# Patient Record
Sex: FEMALE | Race: WHITE | NOT HISPANIC OR LATINO | Employment: FULL TIME | URBAN - METROPOLITAN AREA
[De-identification: names, ages, dates, MRNs, and addresses within clinical notes are randomized per-mention and may not be internally consistent; named-entity substitution may affect disease eponyms.]

---

## 2017-06-07 ENCOUNTER — OFFICE VISIT (OUTPATIENT)
Dept: RETAIL CLINIC | Facility: CLINIC | Age: 32
End: 2017-06-07

## 2017-06-07 VITALS
OXYGEN SATURATION: 100 % | DIASTOLIC BLOOD PRESSURE: 82 MMHG | HEART RATE: 99 BPM | RESPIRATION RATE: 15 BRPM | TEMPERATURE: 98.4 F | SYSTOLIC BLOOD PRESSURE: 125 MMHG

## 2017-06-07 DIAGNOSIS — J02.0 STREP THROAT: Primary | ICD-10-CM

## 2017-06-07 PROBLEM — J02.9 SORE THROAT: Status: ACTIVE | Noted: 2017-06-07

## 2017-06-07 LAB
EXPIRATION DATE: ABNORMAL
INTERNAL CONTROL: ABNORMAL
Lab: ABNORMAL
S PYO AG THROAT QL: POSITIVE

## 2017-06-07 PROCEDURE — 87880 STREP A ASSAY W/OPTIC: CPT | Performed by: NURSE PRACTITIONER

## 2017-06-07 PROCEDURE — 99213 OFFICE O/P EST LOW 20 MIN: CPT | Performed by: NURSE PRACTITIONER

## 2017-06-07 RX ORDER — AMOXICILLIN 500 MG/1
500 TABLET, FILM COATED ORAL 2 TIMES DAILY
Qty: 20 TABLET | Refills: 0 | Status: SHIPPED | OUTPATIENT
Start: 2017-06-07 | End: 2017-06-17

## 2017-06-07 NOTE — PATIENT INSTRUCTIONS

## 2017-06-07 NOTE — PROGRESS NOTES
Crissy Jean is a 31 y.o. female.     Sore Throat    This is a new problem. The current episode started yesterday. The problem has been unchanged. The pain is worse on the right side. Maximum temperature: did not measure. The pain is at a severity of 6/10. The pain is moderate. Associated symptoms include ear pain, headaches and swollen glands. Pertinent negatives include no abdominal pain, congestion, coughing, diarrhea, neck pain, shortness of breath, trouble swallowing or vomiting. She has had exposure to strep. She has tried acetaminophen for the symptoms. The treatment provided mild relief.       The following portions of the patient's history were reviewed and updated as appropriate: allergies, current medications, past family history, past medical history, past social history, past surgical history and problem list.    Review of Systems   Constitutional: Positive for activity change, appetite change and fatigue. Fever: did not measure.   HENT: Positive for ear pain, rhinorrhea and sore throat. Negative for congestion and trouble swallowing.    Eyes: Negative for discharge.   Respiratory: Negative for cough, chest tightness, shortness of breath and wheezing.    Cardiovascular: Negative for chest pain and palpitations.   Gastrointestinal: Negative for abdominal distention, abdominal pain, constipation, diarrhea, nausea and vomiting.   Genitourinary: Negative for difficulty urinating.   Musculoskeletal: Negative for gait problem, neck pain and neck stiffness.   Skin: Negative for color change, pallor and rash.   Neurological: Positive for headaches.   All other systems reviewed and are negative.      Objective   Physical Exam   Constitutional: She is oriented to person, place, and time. Vital signs are normal. She appears well-developed and well-nourished.   HENT:   Head: Normocephalic.   Right Ear: Hearing, tympanic membrane, external ear and ear canal normal.   Left Ear: Hearing, external ear and  ear canal normal. Tympanic membrane is erythematous. A middle ear effusion is present.   Nose: Rhinorrhea present.   Mouth/Throat: Uvula is midline and mucous membranes are normal. Posterior oropharyngeal erythema present. Tonsils are 2+ on the right. Tonsils are 1+ on the left. Tonsillar exudate.   Eyes: Conjunctivae and lids are normal. Pupils are equal, round, and reactive to light.   Neck: Trachea normal and full passive range of motion without pain. Neck supple.   Cardiovascular: Normal rate, regular rhythm and normal heart sounds.    Pulmonary/Chest: Effort normal and breath sounds normal.   Abdominal: Soft. Normal appearance and bowel sounds are normal. There is no tenderness.   Musculoskeletal: Normal range of motion.   Lymphadenopathy:        Head (right side): Submental, submandibular and tonsillar adenopathy present.        Head (left side): Submental, submandibular and tonsillar adenopathy present.     She has cervical adenopathy.   Neurological: She is alert and oriented to person, place, and time. She has normal strength.   Skin: Skin is warm, dry and intact. No rash noted.   Psychiatric: She has a normal mood and affect. Her speech is normal and behavior is normal. Judgment and thought content normal. Cognition and memory are normal.       Assessment/Plan   Kayden was seen today for sore throat.    Diagnoses and all orders for this visit:    Strep throat  -     POCT rapid strep A  -     amoxicillin (AMOXIL) 500 MG tablet; Take 1 tablet by mouth 2 (Two) Times a Day for 10 days.     Rapid strep positive.  Patient agrees with the treatment plan and understands when to return to PCP or seek Urgent care.  Patient may also continue the acetaminophen as directed on the package label for the headache/discomfort and can use lozenges, salt water gargles, and honey for the throat discomfort in addition to increasing her oral hydration and maintaining adequate rest.

## 2018-09-24 ENCOUNTER — OFFICE VISIT (OUTPATIENT)
Dept: RETAIL CLINIC | Facility: CLINIC | Age: 33
End: 2018-09-24

## 2018-09-24 VITALS
TEMPERATURE: 98.1 F | HEART RATE: 95 BPM | SYSTOLIC BLOOD PRESSURE: 140 MMHG | OXYGEN SATURATION: 99 % | RESPIRATION RATE: 20 BRPM | DIASTOLIC BLOOD PRESSURE: 68 MMHG

## 2018-09-24 DIAGNOSIS — H60.502 ACUTE OTITIS EXTERNA OF LEFT EAR, UNSPECIFIED TYPE: Primary | ICD-10-CM

## 2018-09-24 PROCEDURE — 99213 OFFICE O/P EST LOW 20 MIN: CPT | Performed by: NURSE PRACTITIONER

## 2018-09-24 RX ORDER — OFLOXACIN 3 MG/ML
10 SOLUTION AURICULAR (OTIC) DAILY
Qty: 5 ML | Refills: 0 | Status: SHIPPED | OUTPATIENT
Start: 2018-09-24 | End: 2018-10-01

## 2018-09-24 RX ORDER — CIPROFLOXACIN AND DEXAMETHASONE 3; 1 MG/ML; MG/ML
4 SUSPENSION/ DROPS AURICULAR (OTIC) 2 TIMES DAILY
Qty: 7.5 ML | Refills: 0 | Status: SHIPPED | OUTPATIENT
Start: 2018-09-24 | End: 2018-09-24

## 2018-09-24 RX ORDER — ASPIRIN 81 MG/1
81 TABLET ORAL DAILY
COMMUNITY
End: 2021-02-17

## 2018-09-24 NOTE — PATIENT INSTRUCTIONS
"Otitis Externa  Otitis externa is an infection of the outer ear canal. The outer ear canal is the area between the outside of the ear and the eardrum. Otitis externa is sometimes called \"swimmer's ear.\"  What are the causes?  This condition may be caused by:  · Swimming in dirty water.  · Moisture in the ear.  · An injury to the inside of the ear.  · An object stuck in the ear.  · A cut or scrape on the outside of the ear.    What increases the risk?  This condition is more likely to develop in swimmers.  What are the signs or symptoms?  The first symptom of this condition is often itching in the ear. Later signs and symptoms include:  · Swelling of the ear.  · Redness in the ear.  · Ear pain. The pain may get worse when you pull on your ear.  · Pus coming from the ear.    How is this diagnosed?  This condition may be diagnosed by examining the ear and testing fluid from the ear for bacteria and funguses.  How is this treated?  This condition may be treated with:  · Antibiotic ear drops. These are often given for 10-14 days.  · Medicine to reduce itching and swelling.    Follow these instructions at home:  · If you were prescribed antibiotic ear drops, apply them as told by your health care provider. Do not stop using the antibiotic even if your condition improves.  · Take over-the-counter and prescription medicines only as told by your health care provider.  · Keep all follow-up visits as told by your health care provider. This is important.  How is this prevented?  · Keep your ear dry. Use the corner of a towel to dry your ear after you swim or bathe.  · Avoid scratching or putting things in your ear. Doing these things can damage the ear canal or remove the protective wax that lines it, which makes it easier for bacteria and funguses to grow.  · Avoid swimming in lakes, polluted water, or pools that may not have the right amount of chlorine.  · Consider making ear drops and putting 3 or 4 drops in each ear after " you swim. Ask your health care provider about how you can make ear drops.  Contact a health care provider if:  · You have a fever.  · After 3 days your ear is still red, swollen, painful, or draining pus.  · Your redness, swelling, or pain gets worse.  · You have a severe headache.  · You have redness, swelling, pain, or tenderness in the area behind your ear.  This information is not intended to replace advice given to you by your health care provider. Make sure you discuss any questions you have with your health care provider.  Document Released: 12/18/2006 Document Revised: 01/24/2017 Document Reviewed: 09/26/2016  [employer] Patient Education © 2018 [employer]

## 2018-09-24 NOTE — PROGRESS NOTES
Crissy Jean is a 32 y.o. female.     Earache    There is pain in the left ear. This is a new problem. The current episode started yesterday. The problem occurs constantly. The problem has been gradually worsening. There has been no fever. The pain is mild. Associated symptoms include headaches and hearing loss (muffled left ). Pertinent negatives include no coughing, ear discharge, neck pain, rhinorrhea or sore throat. She has tried nothing for the symptoms. There is no history of a chronic ear infection, hearing loss or a tympanostomy tube.       The following portions of the patient's history were reviewed and updated as appropriate: allergies, current medications, past medical history, past social history, past surgical history and problem list.    Review of Systems   Constitutional: Negative for appetite change, chills, diaphoresis, fatigue and fever.   HENT: Positive for ear pain and hearing loss (muffled left ). Negative for congestion, dental problem, ear discharge, facial swelling, postnasal drip, rhinorrhea, sinus pressure, sore throat, tinnitus, trouble swallowing and voice change.    Respiratory: Negative for cough.    Musculoskeletal: Negative for myalgias and neck pain.   Skin: Negative for color change.   Neurological: Positive for headaches. Negative for dizziness.       Objective   Physical Exam   Constitutional: She is oriented to person, place, and time. She appears well-developed and well-nourished. She is cooperative.  Non-toxic appearance. She does not appear ill. No distress.   HENT:   Right Ear: Tympanic membrane, external ear and ear canal normal.   Left Ear: Tympanic membrane and external ear normal. There is swelling (canal moderate edema and erythema, membranes friable with small amount of white cheese like drainage) and tenderness. No mastoid tenderness.   Nose: Nose normal. Right sinus exhibits no maxillary sinus tenderness and no frontal sinus tenderness. Left sinus  exhibits no maxillary sinus tenderness and no frontal sinus tenderness.   Mouth/Throat: Uvula is midline, oropharynx is clear and moist and mucous membranes are normal. Tonsils are 2+ on the right. Tonsils are 2+ on the left. No tonsillar exudate.   Eyes: Conjunctivae and lids are normal.   Neurological: She is alert and oriented to person, place, and time.   Vitals reviewed.      Assessment/Plan   Kayden was seen today for earache.    Diagnoses and all orders for this visit:    Acute otitis externa of left ear, unspecified type  -     Discontinue: ciprofloxacin-dexamethasone (CIPRODEX) 0.3-0.1 % otic suspension; Administer 4 drops into the left ear 2 (Two) Times a Day for 7 days.    Other orders  -     ofloxacin (FLOXIN) 0.3 % otic solution; Administer 10 drops into the left ear Daily for 7 days.          -     Keep ear dry        -     Follow up with PCP for persistent symptoms or UC/ER for worsening symptoms

## 2021-01-04 ENCOUNTER — IMMUNIZATION (OUTPATIENT)
Dept: VACCINE CLINIC | Facility: HOSPITAL | Age: 36
End: 2021-01-04

## 2021-01-04 PROCEDURE — 91300 HC SARSCOV02 VAC 30MCG/0.3ML IM: CPT | Performed by: INTERNAL MEDICINE

## 2021-01-04 PROCEDURE — 0001A: CPT | Performed by: INTERNAL MEDICINE

## 2021-01-26 ENCOUNTER — IMMUNIZATION (OUTPATIENT)
Dept: VACCINE CLINIC | Facility: HOSPITAL | Age: 36
End: 2021-01-26

## 2021-01-26 PROCEDURE — 91300 HC SARSCOV02 VAC 30MCG/0.3ML IM: CPT | Performed by: INTERNAL MEDICINE

## 2021-01-26 PROCEDURE — 0002A: CPT | Performed by: INTERNAL MEDICINE

## 2021-02-01 ENCOUNTER — APPOINTMENT (OUTPATIENT)
Dept: GENERAL RADIOLOGY | Facility: HOSPITAL | Age: 36
End: 2021-02-01

## 2021-02-01 ENCOUNTER — APPOINTMENT (OUTPATIENT)
Dept: CT IMAGING | Facility: HOSPITAL | Age: 36
End: 2021-02-01

## 2021-02-01 ENCOUNTER — HOSPITAL ENCOUNTER (EMERGENCY)
Facility: HOSPITAL | Age: 36
Discharge: HOME | End: 2021-02-01
Attending: EMERGENCY MEDICINE | Admitting: EMERGENCY MEDICINE

## 2021-02-01 VITALS
HEIGHT: 68 IN | BODY MASS INDEX: 33.34 KG/M2 | WEIGHT: 220 LBS | RESPIRATION RATE: 19 BRPM | SYSTOLIC BLOOD PRESSURE: 141 MMHG | OXYGEN SATURATION: 97 % | DIASTOLIC BLOOD PRESSURE: 88 MMHG | TEMPERATURE: 97.5 F | HEART RATE: 88 BPM

## 2021-02-01 DIAGNOSIS — R04.2 HEMOPTYSIS: Primary | ICD-10-CM

## 2021-02-01 DIAGNOSIS — R91.1 LUNG NODULE: ICD-10-CM

## 2021-02-01 DIAGNOSIS — N63.0 BREAST MASS IN FEMALE: ICD-10-CM

## 2021-02-01 LAB
ANION GAP SERPL CALCULATED.3IONS-SCNC: 10.5 MMOL/L (ref 5–15)
B PARAPERT DNA SPEC QL NAA+PROBE: NOT DETECTED
B PERT DNA SPEC QL NAA+PROBE: NOT DETECTED
BASOPHILS # BLD AUTO: 0.04 10*3/MM3 (ref 0–0.2)
BASOPHILS NFR BLD AUTO: 0.4 % (ref 0–1.5)
BUN SERPL-MCNC: 6 MG/DL (ref 6–20)
BUN/CREAT SERPL: 8.1 (ref 7–25)
C PNEUM DNA NPH QL NAA+NON-PROBE: NOT DETECTED
CALCIUM SPEC-SCNC: 9.5 MG/DL (ref 8.6–10.5)
CHLORIDE SERPL-SCNC: 104 MMOL/L (ref 98–107)
CO2 SERPL-SCNC: 24.5 MMOL/L (ref 22–29)
CREAT SERPL-MCNC: 0.74 MG/DL (ref 0.57–1)
DEPRECATED RDW RBC AUTO: 42.9 FL (ref 37–54)
EOSINOPHIL # BLD AUTO: 0.02 10*3/MM3 (ref 0–0.4)
EOSINOPHIL NFR BLD AUTO: 0.2 % (ref 0.3–6.2)
ERYTHROCYTE [DISTWIDTH] IN BLOOD BY AUTOMATED COUNT: 13 % (ref 12.3–15.4)
FLUAV SUBTYP SPEC NAA+PROBE: NOT DETECTED
FLUBV RNA ISLT QL NAA+PROBE: NOT DETECTED
GFR SERPL CREATININE-BSD FRML MDRD: 89 ML/MIN/1.73
GLUCOSE SERPL-MCNC: 90 MG/DL (ref 65–99)
HADV DNA SPEC NAA+PROBE: NOT DETECTED
HCG SERPL QL: NEGATIVE
HCOV 229E RNA SPEC QL NAA+PROBE: NOT DETECTED
HCOV HKU1 RNA SPEC QL NAA+PROBE: NOT DETECTED
HCOV NL63 RNA SPEC QL NAA+PROBE: NOT DETECTED
HCOV OC43 RNA SPEC QL NAA+PROBE: NOT DETECTED
HCT VFR BLD AUTO: 43.6 % (ref 34–46.6)
HGB BLD-MCNC: 14.9 G/DL (ref 12–15.9)
HMPV RNA NPH QL NAA+NON-PROBE: NOT DETECTED
HPIV1 RNA SPEC QL NAA+PROBE: NOT DETECTED
HPIV2 RNA SPEC QL NAA+PROBE: NOT DETECTED
HPIV3 RNA NPH QL NAA+PROBE: NOT DETECTED
HPIV4 P GENE NPH QL NAA+PROBE: NOT DETECTED
IMM GRANULOCYTES # BLD AUTO: 0.03 10*3/MM3 (ref 0–0.05)
IMM GRANULOCYTES NFR BLD AUTO: 0.3 % (ref 0–0.5)
LYMPHOCYTES # BLD AUTO: 2.05 10*3/MM3 (ref 0.7–3.1)
LYMPHOCYTES NFR BLD AUTO: 22.7 % (ref 19.6–45.3)
M PNEUMO IGG SER IA-ACNC: NOT DETECTED
MCH RBC QN AUTO: 31 PG (ref 26.6–33)
MCHC RBC AUTO-ENTMCNC: 34.2 G/DL (ref 31.5–35.7)
MCV RBC AUTO: 90.6 FL (ref 79–97)
MONOCYTES # BLD AUTO: 0.46 10*3/MM3 (ref 0.1–0.9)
MONOCYTES NFR BLD AUTO: 5.1 % (ref 5–12)
NEUTROPHILS NFR BLD AUTO: 6.45 10*3/MM3 (ref 1.7–7)
NEUTROPHILS NFR BLD AUTO: 71.3 % (ref 42.7–76)
NRBC BLD AUTO-RTO: 0 /100 WBC (ref 0–0.2)
PLATELET # BLD AUTO: 313 10*3/MM3 (ref 140–450)
PMV BLD AUTO: 8.5 FL (ref 6–12)
POTASSIUM SERPL-SCNC: 4.4 MMOL/L (ref 3.5–5.2)
QT INTERVAL: 353 MS
RBC # BLD AUTO: 4.81 10*6/MM3 (ref 3.77–5.28)
RHINOVIRUS RNA SPEC NAA+PROBE: NOT DETECTED
RSV RNA NPH QL NAA+NON-PROBE: NOT DETECTED
SARS-COV-2 RNA NPH QL NAA+NON-PROBE: NOT DETECTED
SODIUM SERPL-SCNC: 139 MMOL/L (ref 136–145)
TROPONIN T SERPL-MCNC: <0.01 NG/ML (ref 0–0.03)
WBC # BLD AUTO: 9.05 10*3/MM3 (ref 3.4–10.8)

## 2021-02-01 PROCEDURE — 0202U NFCT DS 22 TRGT SARS-COV-2: CPT | Performed by: EMERGENCY MEDICINE

## 2021-02-01 PROCEDURE — 99283 EMERGENCY DEPT VISIT LOW MDM: CPT

## 2021-02-01 PROCEDURE — 84484 ASSAY OF TROPONIN QUANT: CPT | Performed by: EMERGENCY MEDICINE

## 2021-02-01 PROCEDURE — 80048 BASIC METABOLIC PNL TOTAL CA: CPT | Performed by: EMERGENCY MEDICINE

## 2021-02-01 PROCEDURE — 84703 CHORIONIC GONADOTROPIN ASSAY: CPT | Performed by: EMERGENCY MEDICINE

## 2021-02-01 PROCEDURE — 71275 CT ANGIOGRAPHY CHEST: CPT

## 2021-02-01 PROCEDURE — 85025 COMPLETE CBC W/AUTO DIFF WBC: CPT | Performed by: EMERGENCY MEDICINE

## 2021-02-01 PROCEDURE — 71045 X-RAY EXAM CHEST 1 VIEW: CPT

## 2021-02-01 PROCEDURE — 93010 ELECTROCARDIOGRAM REPORT: CPT | Performed by: INTERNAL MEDICINE

## 2021-02-01 PROCEDURE — 0 IOPAMIDOL PER 1 ML: Performed by: EMERGENCY MEDICINE

## 2021-02-01 PROCEDURE — 93005 ELECTROCARDIOGRAM TRACING: CPT | Performed by: EMERGENCY MEDICINE

## 2021-02-01 RX ORDER — SODIUM CHLORIDE 0.9 % (FLUSH) 0.9 %
10 SYRINGE (ML) INJECTION AS NEEDED
Status: DISCONTINUED | OUTPATIENT
Start: 2021-02-01 | End: 2021-02-01 | Stop reason: HOSPADM

## 2021-02-01 RX ADMIN — IOPAMIDOL 100 ML: 755 INJECTION, SOLUTION INTRAVENOUS at 15:02

## 2021-02-01 NOTE — ED PROVIDER NOTES
EMERGENCY DEPARTMENT ENCOUNTER    Room Number:    Date of encounter:  2021  PCP: Provider, No Known  Historian: Patient,       HPI:  Chief Complaint: Hemoptysis  A complete HPI/ROS/PMH/PSH/SH/FH are unobtainable due to: None    Context: Kayden Jean is a 35 y.o. female who presents to the ED c/o hemoptysis.  Onset today.  She reports blood streaks with thin clear sputum.  She reports having a chronic smoker's cough at baseline.  Mild dyspnea.  No chest pain.  No fever.    On review systems, the patient complains of chronic tingling in her hands and feet for the past 2 years.  This is unchanged.      PAST MEDICAL HISTORY  Active Ambulatory Problems     Diagnosis Date Noted   • Pre-eclampsia 2016   • Mild preeclampsia 2016   • Sore throat 2017     Resolved Ambulatory Problems     Diagnosis Date Noted   • Pregnancy 2016     Past Medical History:   Diagnosis Date   • Abnormal Pap smear of cervix    • Anxiety    • Gestational hypertension    • Otitis externa    • Preeclampsia          PAST SURGICAL HISTORY  Past Surgical History:   Procedure Laterality Date   • COLPOSCOPY W/ BIOPSY / CURETTAGE     • DILATATION AND CURETTAGE     • WISDOM TOOTH EXTRACTION           FAMILY HISTORY  Family History   Problem Relation Age of Onset   • Hypertension Father    • Coronary artery disease Paternal Grandfather          SOCIAL HISTORY  Social History     Socioeconomic History   • Marital status:      Spouse name: Not on file   • Number of children: Not on file   • Years of education: Not on file   • Highest education level: Not on file   Tobacco Use   • Smoking status: Former Smoker     Quit date: 2015     Years since quittin.5   • Smokeless tobacco: Never Used   Substance and Sexual Activity   • Alcohol use: No   • Drug use: No   • Sexual activity: Yes     Partners: Male         ALLERGIES  Codeine        REVIEW OF SYSTEMS  Review of Systems     All systems reviewed and  negative except for those discussed in HPI.       PHYSICAL EXAM    I have reviewed the triage vital signs and nursing notes.    ED Triage Vitals   Temp Heart Rate Resp BP SpO2   02/01/21 1032 02/01/21 1032 02/01/21 1032 02/01/21 1238 02/01/21 1032   97.5 °F (36.4 °C) 107 16 142/85 100 %      Temp src Heart Rate Source Patient Position BP Location FiO2 (%)   02/01/21 1032 02/01/21 1032 02/01/21 1234 02/01/21 1234 --   Tympanic Monitor Lying Right arm        Physical Exam  GENERAL: not distressed  HENT: nares patent  EYES: no scleral icterus  CV: regular rhythm, regular rate, no murmur  RESPIRATORY: normal effort, clear to auscultation bilaterally  ABDOMEN: soft, nontender  MUSCULOSKELETAL: no deformity, no lower extremity edema or tenderness  NEURO: alert, moves all extremities, follows commands  SKIN: warm, dry        LAB RESULTS  Recent Results (from the past 24 hour(s))   Basic Metabolic Panel    Collection Time: 02/01/21  1:20 PM    Specimen: Blood   Result Value Ref Range    Glucose 90 65 - 99 mg/dL    BUN 6 6 - 20 mg/dL    Creatinine 0.74 0.57 - 1.00 mg/dL    Sodium 139 136 - 145 mmol/L    Potassium 4.4 3.5 - 5.2 mmol/L    Chloride 104 98 - 107 mmol/L    CO2 24.5 22.0 - 29.0 mmol/L    Calcium 9.5 8.6 - 10.5 mg/dL    eGFR Non African Amer 89 >60 mL/min/1.73    BUN/Creatinine Ratio 8.1 7.0 - 25.0    Anion Gap 10.5 5.0 - 15.0 mmol/L   CBC Auto Differential    Collection Time: 02/01/21  1:20 PM    Specimen: Blood   Result Value Ref Range    WBC 9.05 3.40 - 10.80 10*3/mm3    RBC 4.81 3.77 - 5.28 10*6/mm3    Hemoglobin 14.9 12.0 - 15.9 g/dL    Hematocrit 43.6 34.0 - 46.6 %    MCV 90.6 79.0 - 97.0 fL    MCH 31.0 26.6 - 33.0 pg    MCHC 34.2 31.5 - 35.7 g/dL    RDW 13.0 12.3 - 15.4 %    RDW-SD 42.9 37.0 - 54.0 fl    MPV 8.5 6.0 - 12.0 fL    Platelets 313 140 - 450 10*3/mm3    Neutrophil % 71.3 42.7 - 76.0 %    Lymphocyte % 22.7 19.6 - 45.3 %    Monocyte % 5.1 5.0 - 12.0 %    Eosinophil % 0.2 (L) 0.3 - 6.2 %    Basophil  % 0.4 0.0 - 1.5 %    Immature Grans % 0.3 0.0 - 0.5 %    Neutrophils, Absolute 6.45 1.70 - 7.00 10*3/mm3    Lymphocytes, Absolute 2.05 0.70 - 3.10 10*3/mm3    Monocytes, Absolute 0.46 0.10 - 0.90 10*3/mm3    Eosinophils, Absolute 0.02 0.00 - 0.40 10*3/mm3    Basophils, Absolute 0.04 0.00 - 0.20 10*3/mm3    Immature Grans, Absolute 0.03 0.00 - 0.05 10*3/mm3    nRBC 0.0 0.0 - 0.2 /100 WBC   hCG, Serum, Qualitative    Collection Time: 02/01/21  1:20 PM    Specimen: Blood   Result Value Ref Range    HCG Qualitative Negative Negative   Troponin    Collection Time: 02/01/21  1:20 PM    Specimen: Blood   Result Value Ref Range    Troponin T <0.010 0.000 - 0.030 ng/mL   Respiratory Panel PCR w/COVID-19(SARS-CoV-2) GEOFF/ERIC/PORFIRIO/PAD/COR/MAD/ERIN In-House, NP Swab in [facility], 3-4 HR TAT - Swab, Nasopharynx    Collection Time: 02/01/21  1:23 PM    Specimen: Nasopharynx; Swab   Result Value Ref Range    ADENOVIRUS, PCR Not Detected Not Detected    Coronavirus 229E Not Detected Not Detected    Coronavirus HKU1 Not Detected Not Detected    Coronavirus NL63 Not Detected Not Detected    Coronavirus OC43 Not Detected Not Detected    COVID19 Not Detected Not Detected - Ref. Range    Human Metapneumovirus Not Detected Not Detected    Human Rhinovirus/Enterovirus Not Detected Not Detected    Influenza A PCR Not Detected Not Detected    Influenza B PCR Not Detected Not Detected    Parainfluenza Virus 1 Not Detected Not Detected    Parainfluenza Virus 2 Not Detected Not Detected    Parainfluenza Virus 3 Not Detected Not Detected    Parainfluenza Virus 4 Not Detected Not Detected    RSV, PCR Not Detected Not Detected    Bordetella pertussis pcr Not Detected Not Detected    Bordetella parapertussis PCR Not Detected Not Detected    Chlamydophila pneumoniae PCR Not Detected Not Detected    Mycoplasma pneumo by PCR Not Detected Not Detected   ECG 12 Lead    Collection Time: 02/01/21  1:31 PM   Result Value Ref Range    QT Interval 353 ms        Ordered the above labs and independently reviewed the results.        RADIOLOGY  Xr Chest 1 View    Result Date: 2/1/2021  ONE VIEW PORTABLE CHEST  HISTORY: Hemoptysis. Possible COVID-19 pneumonia.  FINDINGS: The lungs are well-expanded and clear and the heart and hilar structures are normal. There is no acute disease or change from 07/18/2013.  This report was finalized on 2/1/2021 3:27 PM by Dr. Carson Darby M.D.      Ct Angiogram Chest    Result Date: 2/1/2021  CT ANGIOGRAM CHEST-  HISTORY:  Hemoptysis, smoker.  TECHNIQUE: CT of the chest was performed following the administration of intravenous contrast using a PE protocol. Reformatted and 3-D images were reviewed. Radiation dose reduction techniques were utilized, including automated exposure control and exposure modulation based on body size.  COMPARISON:  CT abdomen and pelvis with contrast 09/27/2013. Chest radiograph 02/01/2021.   FINDINGS:   This is a diagnostic quality PE study. No acute pulmonary embolism is identified to the segmental pulmonary artery level. There are no pathologically enlarged thoracic lymph nodes. Heart size is normal. There is no pericardial effusion. There is a normal thoracic aortic branch pattern. Pleural spaces are clear. Limited imaging through the upper abdomen is within normal limits. There is a 1.5 cm soft tissue density mass in the upper posterior right breast. The visualized osseous structures are age-appropriate in appearance. The trachea and central bronchi are clear. There is no focal consolidation. There is a 0.4 cm nodule along the pleural surface in the anterior inferior right upper lobe.        1.  No acute pulmonary embolism to the segmental pulmonary artery level. 2.  Incompletely characterized 1.5 cm right breast mass. Recommend further evaluation with bilateral diagnostic mammogram and right breast ultrasound. 3.  Nonspecific 0.4 cm nodule in the right upper lobe, which may represent a perifissural lymph  node. Consider follow-up CT chest in one year. 4.  No focal consolidation or effusion.  Discussed with Dr. Plasencia at 3:40 PM.  This report was finalized on 2/1/2021 3:41 PM by Dr. Gertrude Hughes M.D.        I ordered the above noted radiological studies. Reviewed by me and discussed with radiologist.  See dictation for official radiology interpretation.      PROCEDURES    Procedures      MEDICATIONS GIVEN IN ER    Medications   iopamidol (ISOVUE-370) 76 % injection 100 mL (100 mL Intravenous Given by Other 2/1/21 1502)         PROGRESS, DATA ANALYSIS, CONSULTS, AND MEDICAL DECISION MAKING    All labs have been independently reviewed by me.  All radiology studies have been reviewed by me and discussed with radiologist dictating the report.   EKG's independently viewed and interpreted by me.  Discussion below represents my analysis of pertinent findings related to patient's condition, differential diagnosis, treatment plan and final disposition.    Differential diagnosis for hemoptysis:  - bronchitis  - bronchiectasis  - aspergilloma  - tumor  - tuberculosis  - lung abscess  - pulmonary embolism  - coagulopathy   - autoimmune disorders  - AV malformation  - alveolar hemorrhage   - mitral stenosis  - pneumonia    ED Course as of Feb 01 2256   Mon Feb 01, 2021   1333 EKG interpreted by myself.  Time 1331.  Sinus rhythm.  Heart 86.  Normal intervals and axis.  No acute ST abnormality.    [TD]   1552 Troponin T: <0.010 [TD]   1553 Creatinine: 0.74 [TD]   1553 WBC: 9.05 [TD]   1553 COVID19: Not Detected [TD]      ED Course User Index  [TD] Martinez Plasencia II, MD       CT imaging shows no evidence of pneumonia.  No pulmonary embolism.    I did discuss with the patient the abnormal findings of the lung nodule as well as the breast mass.  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  However, I encouraged continued follow-up.  Additionally, I recommended repeat CT scan  in 1 year per radiology recommendations.  I also recommended follow-up with pulmonology should she continue to have the persistent hemoptysis.    PPE: Both the patient and I wore a surgical mask throughout the entire patient encounter. I wore protective goggles.     AS OF 22:56 EST VITALS:    BP - 141/88  HR - 88  TEMP - 97.5 °F (36.4 °C) (Tympanic)  O2 SATS - 97%        DIAGNOSIS  Final diagnoses:   Hemoptysis   Lung nodule   Breast mass in female         DISPOSITION  DISCHARGE    FOLLOW-UP  [facility]  [address] [facility] [address] [phone]  Go to   If symptoms worsen      Follow-up with your primary care physician.  You should have a repeat CT scan in 1 year of your lungs.  Please have her gynecologist follow your breast mass.        Manish Montejo MD  [address] [id number] [phone]    Call in 1 day  to schedule a follow up appointment         Medication List      No changes were made to your prescriptions during this visit.                  Martinez Plasencia II, MD  02/01/21 8094

## 2021-02-01 NOTE — ED TRIAGE NOTES
Woke this am feeling lightheaded c tingling in hands and feet.  She reports she just coughed up a little blood    Patient was placed in face mask during first look triage.  Patient was wearing a face mask throughout encounter.  I wore personal protective equipment throughout the encounter.  Hand hygiene was performed before and after patient encounter.

## 2021-02-02 ENCOUNTER — EPISODE CHANGES (OUTPATIENT)
Dept: CASE MANAGEMENT | Facility: OTHER | Age: 36
End: 2021-02-02

## 2021-02-17 ENCOUNTER — OFFICE VISIT (OUTPATIENT)
Dept: SURGERY | Facility: CLINIC | Age: 36
End: 2021-02-17

## 2021-02-17 VITALS
WEIGHT: 231 LBS | HEART RATE: 96 BPM | HEIGHT: 68 IN | SYSTOLIC BLOOD PRESSURE: 148 MMHG | DIASTOLIC BLOOD PRESSURE: 78 MMHG | BODY MASS INDEX: 35.01 KG/M2 | OXYGEN SATURATION: 99 %

## 2021-02-17 DIAGNOSIS — IMO0001 LUNG NODULE < 6CM ON CT: Primary | ICD-10-CM

## 2021-02-17 PROCEDURE — 99244 OFF/OP CNSLTJ NEW/EST MOD 40: CPT | Performed by: THORACIC SURGERY (CARDIOTHORACIC VASCULAR SURGERY)

## 2021-02-17 NOTE — PROGRESS NOTES
Subjective   Patient ID: Kayden Jean is a 35 y.o. female is being seen for consultation today at the request of [facility] emergency department    History of Present Illness  Dear Colleague,  Kayden Jean was seen in our office today for evaluation and treatment of right upper lobe lung nodule.  Patient is a 35-year-old  female who is an employee at [employer].  She has been smoking for 14 years at 1 pack of cigarettes per day.  She recently presented to the emergency room with hemoptysis.  In retrospect she believes that she had a bloody nose that drained into her larynx and she coughed this up and thought that it was from her lungs.  In the emergency room a CT of her chest was performed.  This showed a 4 mm nodule in the right upper lobe of the lung.  She has been referred here for further evaluation.    She gets bronchitis twice a year which is related to seasonal allergies.  She has had pneumonia 3 times.  She has no history of cancer.  She has had no other history of other pulmonary problems.  She is active without significant shortness of breath.  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    The following portions of the patient's history were reviewed and updated as appropriate: allergies, current medications, past family history, past medical history, past social history, past surgical history and problem list.  Review of Systems   Constitution: Negative.   HENT: Negative.    Eyes: Negative.    Cardiovascular: Negative.    Respiratory: Negative.    Endocrine: Negative.    Hematologic/Lymphatic: Negative.    Skin: Negative.    Musculoskeletal: Negative.    Gastrointestinal: Negative.    Genitourinary: Negative.    Neurological: Negative.    Psychiatric/Behavioral: Negative.    Allergic/Immunologic: Negative.      Patient Active Problem List   Diagnosis   • Pre-eclampsia   • Mild preeclampsia   • Sore throat     Past Medical History:   Diagnosis Date   •  Abnormal Pap smear of cervix    • Anxiety    • Gestational hypertension    • Otitis externa    • Preeclampsia      Past Surgical History:   Procedure Laterality Date   • COLPOSCOPY W/ BIOPSY / CURETTAGE     • DILATATION AND CURETTAGE     • WISDOM TOOTH EXTRACTION       Family History   Problem Relation Age of Onset   • Hypertension Father    • Coronary artery disease Paternal Grandfather      Social History     Socioeconomic History   • Marital status:      Spouse name: Not on file   • Number of children: Not on file   • Years of education: Not on file   • Highest education level: Not on file   Tobacco Use   • Smoking status: Former Smoker     Quit date: 2015     Years since quittin.6   • Smokeless tobacco: Never Used   Substance and Sexual Activity   • Alcohol use: No   • Drug use: No   • Sexual activity: Yes     Partners: Male     No current outpatient medications on file.  Allergies   Allergen Reactions   • Codeine Other (See Comments)     hyperactivity        Objective   Vitals:    21 1009   BP: 148/78   Pulse: 96   SpO2: 99%     Physical Exam  Constitutional:       Appearance: Normal appearance. She is well-developed.   HENT:      Head: Normocephalic.   Eyes:      General: Lids are normal.      Conjunctiva/sclera: Conjunctivae normal.      Pupils: Pupils are equal, round, and reactive to light.   Neck:      Musculoskeletal: Normal range of motion and neck supple.      Thyroid: No thyroid mass or thyromegaly.      Vascular: No carotid bruit, hepatojugular reflux or JVD.      Trachea: Trachea normal.   Cardiovascular:      Rate and Rhythm: Normal rate and regular rhythm.  No extrasystoles are present.     Chest Wall: PMI is not displaced.      Pulses: Normal pulses.      Heart sounds: Normal heart sounds, S1 normal and S2 normal.   Pulmonary:      Effort: Pulmonary effort is normal.      Breath sounds: Normal breath sounds.   Abdominal:      General: Bowel sounds are normal.      Palpations:  Abdomen is soft. There is no mass.      Tenderness: There is no abdominal tenderness.      Hernia: No hernia is present.   Musculoskeletal: Normal range of motion.   Skin:     General: Skin is warm and dry.   Neurological:      Mental Status: She is alert and oriented to person, place, and time.      Cranial Nerves: No cranial nerve deficit.      Sensory: No sensory deficit.      Deep Tendon Reflexes: Reflexes are normal and symmetric.   Psychiatric:         Speech: Speech normal.         Behavior: Behavior normal.         Thought Content: Thought content normal.         Judgment: Judgment normal.       Independent Review of Radiographic Studies:    CT scan of the chest performed February 1, 2021 was independently reviewed.  There was no evidence for pulmonary embolus.  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  There is a 4 mm nodule in the anterior apical aspect of the right upper lobe of the lung.  No other suspicious infiltrates nodules or masses.  No suspicious hilar or mediastinal adenopathy.  No pleural effusions.      Assessment/Plan   Assessment:  4 mm nodule is indeterminate.  This is so small that biopsy would be very difficult and unlikely to be accurate.  Have recommended follow-up with serial CT scans.  Given the small size I think a CT in 6 months is appropriate.  I have discussed this with the patient in detail.  I have answered all of her questions to her satisfaction.  She understands my plan and agrees.    Plan:  Return to the office in 6 months with a CT of the chest without contrast.  I will keep you informed of her progress.  Thank you for allowing me to participate in the care of Ms. Jean.    Diagnoses and all orders for this visit:    Lung nodule < 6cm on CT  -     CT Chest Without Contrast; Future

## 2021-03-10 ENCOUNTER — APPOINTMENT (OUTPATIENT)
Dept: WOMENS IMAGING | Facility: HOSPITAL | Age: 36
End: 2021-03-10

## 2021-03-10 PROCEDURE — G0279 TOMOSYNTHESIS, MAMMO: HCPCS | Performed by: RADIOLOGY

## 2021-03-10 PROCEDURE — 77066 DX MAMMO INCL CAD BI: CPT | Performed by: RADIOLOGY

## 2021-03-10 PROCEDURE — 76641 ULTRASOUND BREAST COMPLETE: CPT | Performed by: RADIOLOGY

## 2021-03-10 PROCEDURE — 77062 BREAST TOMOSYNTHESIS BI: CPT | Performed by: RADIOLOGY

## 2021-08-12 ENCOUNTER — HOSPITAL ENCOUNTER (OUTPATIENT)
Dept: CT IMAGING | Facility: HOSPITAL | Age: 36
Discharge: HOME | End: 2021-08-12
Admitting: THORACIC SURGERY (CARDIOTHORACIC VASCULAR SURGERY)

## 2021-08-12 DIAGNOSIS — IMO0001 LUNG NODULE < 6CM ON CT: ICD-10-CM

## 2021-08-12 PROCEDURE — 71250 CT THORAX DX C-: CPT

## 2021-08-16 ENCOUNTER — TELEPHONE (OUTPATIENT)
Dept: SURGERY | Facility: CLINIC | Age: 36
End: 2021-08-16

## 2021-08-16 NOTE — TELEPHONE ENCOUNTER
Patient was called and left voicemail regarding appointment change with details. She was given a call back number and information to call back if new appointment date and time are not good for her.

## 2021-08-24 ENCOUNTER — OFFICE VISIT (OUTPATIENT)
Dept: SURGERY | Facility: CLINIC | Age: 36
End: 2021-08-24

## 2021-08-24 VITALS
WEIGHT: 231.48 LBS | OXYGEN SATURATION: 98 % | BODY MASS INDEX: 35.08 KG/M2 | SYSTOLIC BLOOD PRESSURE: 158 MMHG | TEMPERATURE: 97.4 F | RESPIRATION RATE: 16 BRPM | DIASTOLIC BLOOD PRESSURE: 95 MMHG | HEIGHT: 68 IN | HEART RATE: 91 BPM

## 2021-08-24 DIAGNOSIS — IMO0001 LUNG NODULE < 6CM ON CT: Primary | ICD-10-CM

## 2021-08-24 PROCEDURE — 99212 OFFICE O/P EST SF 10 MIN: CPT | Performed by: THORACIC SURGERY (CARDIOTHORACIC VASCULAR SURGERY)

## 2021-08-24 RX ORDER — BUSPIRONE HYDROCHLORIDE 10 MG/1
TABLET ORAL DAILY
COMMUNITY
Start: 2021-03-25 | End: 2022-11-26

## 2021-08-24 RX ORDER — ASPIRIN 81 MG/1
81 TABLET ORAL DAILY
COMMUNITY
End: 2022-11-26

## 2021-08-24 RX ORDER — HYDROXYZINE HYDROCHLORIDE 25 MG/1
25 TABLET, FILM COATED ORAL
COMMUNITY
Start: 2021-03-02 | End: 2022-11-26

## 2021-08-24 NOTE — PROGRESS NOTES
"Chief Complaint  No chief complaint on file.     Pulmonary nodule    Subjective          Kayden Jean presents to [facility] THORACIC SURGERY for a 6 month follow up CT chest.  History of Present Illness     35-year-old  female seen in the office today for follow-up of the 4 mm right upper lobe lung nodule.  Patient has a history of smoking approximately 1/2 pack of cigarettes per day for 14 years.  She stopped smoking right after seeing me for this nodule in February.  She reports no cough or hemoptysis.  She has no hoarseness or change in her voice.  She has no pleuritic pain.  She has had no unexplained weight loss.  She is active without significant shortness of breath or wheezing.    Objective   Vital Signs:   /95 (BP Location: Right arm, Patient Position: Sitting, Cuff Size: Adult)   Pulse 91   Temp 97.4 °F (36.3 °C) (Temporal)   Resp 16   Ht 172.2 cm (67.8\")   Wt 105 kg (231 lb 7.7 oz)   SpO2 98%   BMI 35.41 kg/m²     Physical Exam     Chest: No cervical supraclavicular or axillary adenopathy.    Pulmonary: Lungs are clear to auscultation with equal breath sounds bilaterally.  Good air movement in all lung fields.    Cardiac: Regular rate and rhythm.  No murmur or gallop.  No dependent edema.    Result Review :   The following data was reviewed by: Tello Chris III, MD on 08/24/2021:    CT scan of the chest performed August 12, 2021 was independently reviewed and compared to the CT scan from February 1, 2021.  There has been no change in the 4 mm nodule in the anterior inferior aspect of the right upper lobe.  There are no new infiltrates nodules or masses.  There is no suspicious hilar or mediastinal adenopathy.  There are no pleural effusions.  There is a 15 mm right upper posterior breast mass without change.         Assessment and Plan    Diagnoses and all orders for this visit:    1. Lung nodule < 6cm on CT (Primary)  -     CT Chest Without Contrast; " Future      I spent 13 minutes caring for Kayden on this date of service. This time includes time spent by me in the following activities:preparing for the visit, reviewing tests, performing a medically appropriate examination and/or evaluation , counseling and educating the patient/family/caregiver, ordering medications, tests, or procedures, referring and communicating with other health care professionals  and documenting information in the medical record  Follow Up     Pulmonary nodule is stable and most likely does not represent a significant problem.  Because of her smoking history this should be followed.  I have recommended another CT of the chest without contrast in 1 year.    The breast lesion has been biopsied in the past and is benign.  This is followed by her primary care team.    Patient will return to see me in the office in 1year with a CT of the chest without contrast.  I will keep you informed of her progress.    Return in about 1 year (around 8/24/2022) for Recheck.  Patient was given instructions and counseling regarding her condition or for health maintenance advice. Please see specific information pulled into the AVS if appropriate.

## 2021-12-13 ENCOUNTER — APPOINTMENT (OUTPATIENT)
Dept: WOMENS IMAGING | Facility: HOSPITAL | Age: 36
End: 2021-12-13

## 2021-12-13 PROCEDURE — 76641 ULTRASOUND BREAST COMPLETE: CPT | Performed by: RADIOLOGY

## 2021-12-13 PROCEDURE — 77061 BREAST TOMOSYNTHESIS UNI: CPT | Performed by: RADIOLOGY

## 2021-12-13 PROCEDURE — 77065 DX MAMMO INCL CAD UNI: CPT | Performed by: RADIOLOGY

## 2021-12-13 PROCEDURE — G0279 TOMOSYNTHESIS, MAMMO: HCPCS | Performed by: RADIOLOGY

## 2022-08-22 ENCOUNTER — HOSPITAL ENCOUNTER (OUTPATIENT)
Dept: CT IMAGING | Facility: HOSPITAL | Age: 37
Discharge: HOME | End: 2022-08-22
Admitting: THORACIC SURGERY (CARDIOTHORACIC VASCULAR SURGERY)

## 2022-08-22 DIAGNOSIS — IMO0001 LUNG NODULE < 6CM ON CT: ICD-10-CM

## 2022-08-22 PROCEDURE — 71250 CT THORAX DX C-: CPT

## 2022-08-31 ENCOUNTER — OFFICE VISIT (OUTPATIENT)
Dept: SURGERY | Facility: CLINIC | Age: 37
End: 2022-08-31

## 2022-08-31 VITALS
WEIGHT: 242 LBS | DIASTOLIC BLOOD PRESSURE: 92 MMHG | BODY MASS INDEX: 35.84 KG/M2 | HEART RATE: 117 BPM | SYSTOLIC BLOOD PRESSURE: 140 MMHG | OXYGEN SATURATION: 95 % | HEIGHT: 69 IN

## 2022-08-31 DIAGNOSIS — IMO0001 LUNG NODULE < 6CM ON CT: Primary | ICD-10-CM

## 2022-08-31 PROCEDURE — 99212 OFFICE O/P EST SF 10 MIN: CPT | Performed by: NURSE PRACTITIONER

## 2023-09-05 ENCOUNTER — HOSPITAL ENCOUNTER (OUTPATIENT)
Facility: HOSPITAL | Age: 38
Discharge: HOME | End: 2023-09-05
Admitting: NURSE PRACTITIONER
Payer: COMMERCIAL

## 2023-09-05 DIAGNOSIS — IMO0001 LUNG NODULE < 6CM ON CT: ICD-10-CM

## 2023-09-05 PROCEDURE — 71250 CT THORAX DX C-: CPT

## 2023-09-11 ENCOUNTER — TELEPHONE (OUTPATIENT)
Dept: SURGERY | Facility: CLINIC | Age: 38
End: 2023-09-11
Payer: COMMERCIAL

## 2023-09-14 ENCOUNTER — OFFICE VISIT (OUTPATIENT)
Dept: SURGERY | Facility: CLINIC | Age: 38
End: 2023-09-14
Payer: COMMERCIAL

## 2023-09-14 VITALS
HEART RATE: 95 BPM | HEIGHT: 69 IN | OXYGEN SATURATION: 98 % | BODY MASS INDEX: 31.84 KG/M2 | SYSTOLIC BLOOD PRESSURE: 136 MMHG | DIASTOLIC BLOOD PRESSURE: 86 MMHG | WEIGHT: 215 LBS

## 2023-09-14 DIAGNOSIS — R91.1 INCIDENTAL PULMONARY NODULE: Primary | ICD-10-CM

## 2023-09-14 NOTE — PROGRESS NOTES
THORACIC SURGERY CLINIC FOLLOW  UP NOTE    REASON FOR VISIT: Follow-up of pulmonary nodules.     Subjective   HISTORY OF PRESENTING ILLNESS:   Kayden Jean is a 37 y.o. female has significant medical problems as mentioned below.     Kayden Jean is a pleasant 36-year-old female who presents to the office today for continued follow-up and surveillance of pulmonary nodules.  The patient has a smoking history of approximately half pack of cigarettes per day for about 14 years but quit smoking after this nodule was found in February 2021.      She reports no symptoms currently and denies any chest pain, dyspnea, coughing, and can walk 1 block without getting short of breath. She only experiences coughing during allergy season. She has chronic bronchitis that typically occurs during the fall season. She has only been in the hospital with pneumonia on 1 occasion. [place], but she does not have any pets. She is a former smoker and quit when her nodules were diagnosed. She has no history of any cancer and there is no family history of lung cancer.     CT scan of the chest on 9/5/2023 reported stable right upper lobe micronodules.  There is nodular density in the right breast.  She is aware of the asymmetry in her breasts seen on the imaging and was seen by her primary care provider and had an ultrasound performed. It is suspected that she has fibroadenoma and a biopsy was collected and resulted as benign. It has been monitored by frequent mammograms since age 24 when it was discovered.     Past Medical History:   Diagnosis Date    Abnormal Pap smear of cervix     Anxiety     Gestational hypertension     Otitis externa     Preeclampsia        Past Surgical History:   Procedure Laterality Date    COLPOSCOPY W/ BIOPSY / CURETTAGE      DILATATION AND CURETTAGE      WISDOM TOOTH EXTRACTION         Family History   Problem Relation Age of Onset     "Hypertension Father     Coronary artery disease Paternal Grandfather        Social History     Socioeconomic History    Marital status:    Tobacco Use    Smoking status: Former     Types: Cigarettes     Quit date: 2015     Years since quittin.2    Smokeless tobacco: Never   Vaping Use    Vaping Use: Never used   Substance and Sexual Activity    Alcohol use: No    Drug use: No    Sexual activity: Yes     Partners: Male       No current outpatient medications on file.     Allergies   Allergen Reactions    Codeine Other (See Comments)     hyperactivity             Objective    OBJECTIVE:     VITAL SIGNS:  /86 (BP Location: Right arm, Patient Position: Sitting, Cuff Size: Large Adult)   Pulse 95   Ht 175.3 cm (69\")   Wt 97.5 kg (215 lb)   SpO2 98%   BMI 31.75 kg/m²     PHYSICAL EXAM:  Constitutional:       Appearance: Normal appearance.   HENT:      Head: Normocephalic.      Right Ear: External ear normal.      Left Ear: External ear normal.      Nose: Nose normal.      Mouth/Throat: No obvious deformity     Pharynx: Oropharynx is clear.   Eyes:      Conjunctiva/sclera: Conjunctivae normal.   Cardiovascular:      Rate and Rhythm: Normal rate.      Pulses: Normal pulses.   Pulmonary:      Effort: Pulmonary effort is normal.  Abdominal:      Palpations: Abdomen is soft.   Musculoskeletal:         General: Normal range of motion.      Cervical back: Normal range of motion.   Skin:     General: Skin is warm.   Neurological:      General: No focal deficit present.      Mental Status: He is alert and oriented to person, place, and time.     LAB RESULTS:  I have reviewed all the available laboratory results in the chart.    RESULTS REVIEW:  I have reviewed the patient's all relevant laboratory and imaging findings.     IMAGING RESULTS:    Imaging reviewed.        ASSESSMENT & PLAN:  Kayden Jean is a 37 y.o. female with significant medical conditions as mentioned above presented to my " clinic.    Diagnosis: Pulmonary nodules.     We have been monitoring her pulmonary nodules for 2 years due to her previous 14-year history of smoking half a pack of cigarettes daily. It is not recommended that the patient needs a follow-up CT scan before age 49 provided she has continued to not smoke.  She can follow-up with thoracic surgery as needed in future.    We briefly discussed the asymmetry found on her recent CT scan and she is following up with her primary with routine mammograms to monitor.     I discussed the patients findings and my recommendations with the patient. The patient was given adequate time to ask questions and all questions were answered to patient satisfaction.     Shaggy Peterson MD  Thoracic Surgeon  [facility]        Dictated utilizing Dragon dictation    I spent 45 minutes caring for Kayden on this date of service. This time includes time spent by me in the following activities:preparing for the visit, reviewing tests, obtaining and/or reviewing a separately obtained history, performing a medically appropriate examination and/or evaluation , counseling and educating the patient/family/caregiver, ordering medications, tests, or procedures, referring and communicating with other health care professionals , documenting information in the medical record, independently interpreting results and communicating that information with the patient/family/caregiver, and care coordination and more than half the time was spent in direct face to face evaluation and decision making.     Transcribed from ambient dictation for Shaggy Peterson MD by Margo Luong.  09/14/23   11:16 EDT    Patient or patient representative verbalized consent to the visit recording.  I have personally performed the services described in this document as transcribed by the above individual, and it is both accurate and complete.

## 2025-04-01 ENCOUNTER — APPOINTMENT (OUTPATIENT)
Dept: WOMENS IMAGING | Facility: HOSPITAL | Age: 40
End: 2025-04-01
Payer: COMMERCIAL

## 2025-04-01 PROCEDURE — 77065 DX MAMMO INCL CAD UNI: CPT | Performed by: RADIOLOGY

## 2025-04-01 PROCEDURE — G0279 TOMOSYNTHESIS, MAMMO: HCPCS | Performed by: RADIOLOGY

## 2025-04-01 PROCEDURE — 77061 BREAST TOMOSYNTHESIS UNI: CPT | Performed by: RADIOLOGY

## 2025-04-07 ENCOUNTER — APPOINTMENT (OUTPATIENT)
Dept: WOMENS IMAGING | Facility: HOSPITAL | Age: 40
End: 2025-04-07
Payer: COMMERCIAL

## 2025-04-07 ENCOUNTER — LAB REQUISITION (OUTPATIENT)
Dept: LAB | Facility: HOSPITAL | Age: 40
End: 2025-04-07
Payer: COMMERCIAL

## 2025-04-07 ENCOUNTER — HOSPITAL ENCOUNTER (OUTPATIENT)
Dept: MAMMOGRAPHY | Facility: HOSPITAL | Age: 40
Discharge: HOME | End: 2025-04-07
Payer: COMMERCIAL

## 2025-04-07 DIAGNOSIS — N63.10 UNSPECIFIED LUMP IN THE RIGHT BREAST, UNSPECIFIED QUADRANT: ICD-10-CM

## 2025-04-07 DIAGNOSIS — R92.1 CALCIFICATION OF RIGHT BREAST: ICD-10-CM

## 2025-04-07 PROCEDURE — C1819 TISSUE LOCALIZATION-EXCISION: HCPCS | Performed by: RADIOLOGY

## 2025-04-07 PROCEDURE — 88305 TISSUE EXAM BY PATHOLOGIST: CPT | Performed by: OBSTETRICS & GYNECOLOGY

## 2025-04-07 PROCEDURE — 19081 BX BREAST 1ST LESION STRTCTC: CPT | Performed by: RADIOLOGY

## 2025-04-07 PROCEDURE — 76098 X-RAY EXAM SURGICAL SPECIMEN: CPT

## 2025-04-07 PROCEDURE — A4648 IMPLANTABLE TISSUE MARKER: HCPCS | Performed by: RADIOLOGY

## 2025-04-08 LAB
CYTO UR: NORMAL
DX PRELIMINARY: NORMAL
LAB AP CASE REPORT: NORMAL
LAB AP CLINICAL INFORMATION: NORMAL
LAB AP INTRADEPARTMENTAL CONSULT: NORMAL
PATH REPORT.FINAL DX SPEC: NORMAL
PATH REPORT.GROSS SPEC: NORMAL